# Patient Record
Sex: FEMALE | Race: WHITE | Employment: OTHER | URBAN - METROPOLITAN AREA
[De-identification: names, ages, dates, MRNs, and addresses within clinical notes are randomized per-mention and may not be internally consistent; named-entity substitution may affect disease eponyms.]

---

## 2022-03-20 ENCOUNTER — HOSPITAL ENCOUNTER (EMERGENCY)
Age: 72
Discharge: HOME | End: 2022-03-20
Attending: EMERGENCY MEDICINE
Payer: MEDICARE

## 2022-03-20 ENCOUNTER — APPOINTMENT (OUTPATIENT)
Dept: GENERAL RADIOLOGY | Age: 72
End: 2022-03-20
Payer: MEDICARE

## 2022-03-20 VITALS
HEIGHT: 66 IN | TEMPERATURE: 97.3 F | RESPIRATION RATE: 20 BRPM | SYSTOLIC BLOOD PRESSURE: 136 MMHG | WEIGHT: 200 LBS | BODY MASS INDEX: 32.14 KG/M2 | OXYGEN SATURATION: 100 % | DIASTOLIC BLOOD PRESSURE: 55 MMHG | HEART RATE: 71 BPM

## 2022-03-20 DIAGNOSIS — M25.462 PAIN AND SWELLING OF LEFT KNEE: Primary | ICD-10-CM

## 2022-03-20 DIAGNOSIS — M25.562 PAIN AND SWELLING OF LEFT KNEE: Primary | ICD-10-CM

## 2022-03-20 PROCEDURE — 99284 EMERGENCY DEPT VISIT MOD MDM: CPT

## 2022-03-20 PROCEDURE — 73560 X-RAY EXAM OF KNEE 1 OR 2: CPT

## 2022-03-20 PROCEDURE — 96372 THER/PROPH/DIAG INJ SC/IM: CPT

## 2022-03-20 PROCEDURE — 6360000002 HC RX W HCPCS: Performed by: EMERGENCY MEDICINE

## 2022-03-20 RX ORDER — METHYLPREDNISOLONE SODIUM SUCCINATE 125 MG/2ML
80 INJECTION, POWDER, LYOPHILIZED, FOR SOLUTION INTRAMUSCULAR; INTRAVENOUS DAILY
Status: DISCONTINUED | OUTPATIENT
Start: 2022-03-20 | End: 2022-03-20 | Stop reason: HOSPADM

## 2022-03-20 RX ORDER — PREDNISONE 10 MG/1
50 TABLET ORAL DAILY
Qty: 25 TABLET | Refills: 0 | Status: SHIPPED | OUTPATIENT
Start: 2022-03-20 | End: 2022-03-25

## 2022-03-20 RX ADMIN — METHYLPREDNISOLONE SODIUM SUCCINATE 80 MG: 125 INJECTION, POWDER, FOR SOLUTION INTRAMUSCULAR; INTRAVENOUS at 12:23

## 2022-03-20 ASSESSMENT — PAIN SCALES - GENERAL: PAINLEVEL_OUTOF10: 9

## 2022-03-20 ASSESSMENT — PAIN DESCRIPTION - FREQUENCY: FREQUENCY: INTERMITTENT

## 2022-03-20 ASSESSMENT — PAIN - FUNCTIONAL ASSESSMENT
PAIN_FUNCTIONAL_ASSESSMENT: PREVENTS OR INTERFERES SOME ACTIVE ACTIVITIES AND ADLS
PAIN_FUNCTIONAL_ASSESSMENT: 0-10

## 2022-03-20 ASSESSMENT — PAIN DESCRIPTION - LOCATION: LOCATION: KNEE

## 2022-03-20 ASSESSMENT — PAIN DESCRIPTION - ORIENTATION: ORIENTATION: LEFT

## 2022-03-20 ASSESSMENT — PAIN DESCRIPTION - PAIN TYPE: TYPE: ACUTE PAIN

## 2022-03-20 ASSESSMENT — PAIN DESCRIPTION - DESCRIPTORS: DESCRIPTORS: DISCOMFORT

## 2022-03-20 ASSESSMENT — PAIN DESCRIPTION - ONSET: ONSET: ON-GOING

## 2022-03-20 NOTE — ED PROVIDER NOTES
[address] COMPLAINT  Knee Pain       HISTORY OF PRESENT ILLNESS  Monisha Linda is a 66 y.o. female  who presents to the ED complaining of left knee pain. The patient states that she has been having pain for the past week it is progressively worsened. She denies any specific injury, states she has had some intermittent pains in the past.  She states it is increasingly difficult to walk. She denies redness around the area, numbness tingling or weakness in the leg or foot. She denies any fevers, chills or constitutional symptoms. Denies chest pain or shortness of breath. No other complaints, modifying factors or associated symptoms. I have reviewed the following from the nursing documentation. History reviewed. No pertinent past medical history. Past Surgical History:   Procedure Laterality Date    HYSTERECTOMY       History reviewed. No pertinent family history. Social History     Socioeconomic History    Marital status:      Spouse name: Not on file    Number of children: Not on file    Years of education: Not on file    Highest education level: Not on file   Occupational History    Not on file   Tobacco Use    Smoking status: Former Smoker     Quit date: 10/10/2008     Years since quittin.4    Smokeless tobacco: Never Used   Substance and Sexual Activity    Alcohol use: No    Drug use: No    Sexual activity: Not on file   Other Topics Concern    Not on file   Social History Narrative    Not on file     Social Determinants of Health     Financial Resource Strain:     Difficulty of Paying Living Expenses: Not on file   Food Insecurity:     Worried About [address] in the Last Year: Not on file    [address] in the Last Year: Not on file   Transportation Needs:     Lack of Transportation (Medical): Not on file    Lack of Transportation (Non-Medical):  Not on file   Physical Activity:     Days of Exercise per Week: Not on file   [employer] of Exercise per Session: Not on file   Stress:     Feeling of Stress : Not on file   Social Connections:     Frequency of Communication with Friends and Family: Not on file    Frequency of Social Gatherings with Friends and Family: Not on file    Attends Rastafarian Services: Not on file    Active Member of Clubs or Organizations: Not on file    Attends Club or Organization Meetings: Not on file    Marital Status: Not on file   Intimate Partner Violence:     Fear of Current or Ex-Partner: Not on file    Emotionally Abused: Not on file    Physically Abused: Not on file    Sexually Abused: Not on file   Housing Stability:     Unable to Pay for Housing in the Last Year: Not on file    Number of [place] in the Last Year: Not on file    Unstable Housing in the Last Year: Not on file     No current facility-administered medications for this encounter. Current Outpatient Medications   Medication Sig Dispense Refill    predniSONE (DELTASONE) 10 MG tablet Take 5 tablets by mouth daily for 5 days 25 tablet 0    oxyCODONE-acetaminophen (PERCOCET) 5-325 MG per tablet Take 1-2 tabs every 4-6 hours prn pain 20 tablet 0    ondansetron (ZOFRAN) 4 MG tablet Take 1 tablet by mouth every 8 hours as needed for Nausea 20 tablet 0     No Known Allergies    REVIEW OF SYSTEMS  10 systems reviewed, pertinent positives per HPI otherwise noted to be negative. PHYSICAL EXAM  BP (!) 136/55   Pulse 71   Temp 97.3 °F (36.3 °C) (Oral)   Resp 20   Ht 5' 6\" (1.676 m)   Wt 200 lb (90.7 kg)   SpO2 100%   BMI 32.28 kg/m²    Physical exam:  General appearance: awake and cooperative. No distress. Non toxic appearing. Skin: Warm and dry. No rashes or lesions. HENT: Normocephalic. Atraumatic. Mucus membranes are moist.   Neck: supple  Eyes: ANA. EOM intact. Heart: RRR. No murmurs. Lungs: Respirations unlabored. CTAB. No wheezes, rales, or rhonchi. Good air exchange  Abdomen: No tenderness. Soft. Non distended. No peritoneal signs. Musculoskeletal: Left knee: Mild soft tissue swelling medially, no circumferential joint erythema or effusion, tenderness to palpation along the medial aspect of the joint; ACL and PCL intact no joint laxity. Compartments soft. No deformity. All extremities neurovascularly intact. Radial, Dp, and PT pulses +2/4 bilaterally  Neurological: Alert and oriented. No focal deficits. No aphasia or dysarthria. No gait ataxia. Psychiatric: Normal mood and affect. LABS  I have reviewed all labs for this visit. No results found for this visit on 03/20/22. ECG    RADIOLOGY  XR KNEE LEFT (1-2 VIEWS)    Result Date: 3/20/2022  3 VIEWS OF THE LEFT KNEE HISTORY: Pain and swelling FINDINGS: No fracture or dislocation. Patellofemoral and tibiofemoral joints are maintained. No discrete soft tissue abnormality identified. Question small suprapatellar joint effusion. 1.  No findings for acute bony abnormality. 2.  Questioned small joint effusion. ED COURSE/MDM  Patient seen and evaluated. Old records reviewed. Labs and imaging reviewed and results discussed with patient. This is a 64-year-old female presenting for evaluation of left knee pain. Her vital signs are within normal limits. She is nontoxic-appearing on exam.  She does have some mild soft tissue swelling and tenderness to the medial aspect of her knee, there is no circumferential joint swelling erythema.-Suspicion. Patient likely has some musculoskeletal disease, and has an inflammatory process causing her swelling. She has an unremarkable x-ray. She is given a dose of Solu-Medrol here. I will give her a short course of prednisone for home and she is given orthopedic referral.  We did discuss symptomatic care instructions for home as well as strict return precautions back to the ED and she voices understanding.     During the patient's ED course, the patient was given:  Medications - No data to display CLINICAL IMPRESSION  1. Pain and swelling of left knee        Blood pressure (!) 136/55, pulse 71, temperature 97.3 °F (36.3 °C), temperature source Oral, resp. rate 20, height 5' 6\" (1.676 m), weight 200 lb (90.7 kg), SpO2 100 %. Patient was given scripts for the following medications. I counseled patient how to take these medications. Discharge Medication List as of 3/20/2022 12:54 PM      START taking these medications    Details   predniSONE (DELTASONE) 10 MG tablet Take 5 tablets by mouth daily for 5 days, Disp-25 tablet, R-0Normal             Follow-up with:  [address]    Call in 1 day      Winsome Bowens, [address] [place] [address]  [phone]            DISCLAIMER: This chart was created using Dragon dictation software. Efforts were made by me to ensure accuracy, however some errors may be present due to limitations of this technology and occasionally words are not transcribed correctly.        Jun NixAt[facility] Centerjasbir  03/22/22 0514

## 2022-03-23 ENCOUNTER — TELEPHONE (OUTPATIENT)
Dept: ORTHOPEDIC SURGERY | Age: 72
End: 2022-03-23

## 2022-03-23 NOTE — TELEPHONE ENCOUNTER
Appointment Request     Patient requesting earlier appointment: Yes  Appointment offered to patient: 3/30/22  Patient Contact Number: [phone]    THE PATIENT WOULD BE A NEW PATIENT SHE WOULD LIKE TO GET IN ON 3/24/22 AT THE [facility] LOCATION IT'S FOR HER LT KNEE IF POSSIBLE. PLEASE ADVISE.

## 2022-03-24 ENCOUNTER — OFFICE VISIT (OUTPATIENT)
Dept: ORTHOPEDIC SURGERY | Age: 72
End: 2022-03-24
Payer: MEDICARE

## 2022-03-24 VITALS — WEIGHT: 200 LBS | HEIGHT: 66 IN | BODY MASS INDEX: 32.14 KG/M2

## 2022-03-24 DIAGNOSIS — G89.29 CHRONIC PAIN OF RIGHT KNEE: ICD-10-CM

## 2022-03-24 DIAGNOSIS — M17.0 PRIMARY OSTEOARTHRITIS OF BOTH KNEES: Primary | ICD-10-CM

## 2022-03-24 DIAGNOSIS — M25.561 CHRONIC PAIN OF RIGHT KNEE: ICD-10-CM

## 2022-03-24 PROCEDURE — 99203 OFFICE O/P NEW LOW 30 MIN: CPT | Performed by: PHYSICIAN ASSISTANT

## 2022-03-24 RX ORDER — ALBUTEROL SULFATE 90 UG/1
AEROSOL, METERED RESPIRATORY (INHALATION)
COMMUNITY
Start: 2021-12-24

## 2022-03-24 RX ORDER — FLUTICASONE PROPIONATE AND SALMETEROL XINAFOATE 115; 21 UG/1; UG/1
AEROSOL, METERED RESPIRATORY (INHALATION)
COMMUNITY
Start: 2022-03-16

## 2022-03-24 ASSESSMENT — ENCOUNTER SYMPTOMS: SHORTNESS OF BREATH: 1

## 2022-03-24 NOTE — PROGRESS NOTES
Review of Systems   Respiratory: Positive for shortness of breath. Asthma   All other systems reviewed and are negative.

## 2022-03-30 ENCOUNTER — HOSPITAL ENCOUNTER (OUTPATIENT)
Dept: PHYSICAL THERAPY | Age: 72
Setting detail: THERAPIES SERIES
Discharge: HOME | End: 2022-03-30
Payer: MEDICARE

## 2022-03-30 PROCEDURE — 97161 PT EVAL LOW COMPLEX 20 MIN: CPT | Performed by: PHYSICAL THERAPIST

## 2022-03-30 PROCEDURE — 97530 THERAPEUTIC ACTIVITIES: CPT | Performed by: PHYSICAL THERAPIST

## 2022-03-30 PROCEDURE — 97110 THERAPEUTIC EXERCISES: CPT | Performed by: PHYSICAL THERAPIST

## 2022-03-30 NOTE — FLOWSHEET NOTE
[facility] and [address],  [facility], [facility] [address] [place] [address]  [facility],[address]   Larry Ghotra  Phone: [phone]  Fax: [phone]      Physical Therapy Daily Treatment Note  Date:  3/30/2022    Patient Name:  Mirian Garcia    :  1950  MRN: [id number]  Restrictions/Precautions:    Medical/Treatment Diagnosis Information:  Diagnosis: M17.0 (ICD-10-CM) - Primary osteoarthritis of both knees  Treatment Diagnosis: Knee pain/ swelling/ difficulty walking/ limited ROM/ LE muscle weakness/ LE balance deficits    Insurance/Certification information:   AETNA/ EFF 2022/ IND OOP MAX 6700; MET 90/ 0% CO-INS/ CO-PAY 40 PER VISIT/ INS % WHEN MET/ NO VISIT LIMIT; MED NEC/ [email or web address] HE[facility] NO/ NO AUTH REQ/ REF #[id number]/ 3-30-22  Physician Information:   Emilee Barthel, MD  Plan of care signed (Y/N): Pending    Date of Patient follow up with Physician: FU PRN per change in symptoms    G-Code (if applicable):      Date G-Code Applied:  3/30/22   LEFS Score: 48/ 80= 60%    Progress Note:          []? Yes                        [x]? No              Next due by: Visit #10  (22)     Latex Allergy:  [x]? NO      []? YES  Preferred Language for Healthcare:   [x]? English       []? other:    Visit # Insurance Allowable   1 Medical necessity     Pain Scale: 0 /10 @ rest                    2/ 10 @ worst  Easing factors: Rest; meds; positional changes  Provocative factors: Prolonged standing; walking; stairs; kneeling; squatting   Type: []? Constant       [x]? Intermittent  []? Radiating     []? Localized     []? other:                  SUBJECTIVE: Patient states that a few weeks ago, she was getting out of bed in the morning and felt acute medial knee pain. States insidious onset; progressive over the prior 3 weeks, but seemed to get mild relief after a steroid injection/ MDP/ OTC NSAIDS. Feels stiff/ painful after use of either heat or ice. Feels L LE is weaker/ stiffer than the R. Wants to resume pain free work and begin a home exercise program.  (-) giving way  (+) night pain/ positional  (-) popping  (-)catching/locking  (-) swelling  (+) stiffness/ sitting  (+) stairs/ up= down  (NO) kneeling/squatting    OBJECTIVE:       LEFS Score: 48/ 80= 60% (3/30/22; Initial)     3-30-22  Flexibility L R Comment   Hamstring ++ ++     Gastroc + +     ITB + +     Quad ++ +                  3-30-22            ROM PROM AROM Overpressure Comment     L R L R L R     Flexion 105 130             Extension 0 0                                                    3-30-22  Strength L R Comment   Quad 3+/5 4/5 ITA 0 deg   Hamstring         Gastroc         Hip  flexion         Hip abd                                3-30-22  Special Test Results/Comment   Meniscal Click (-)  Pain at end ROM   Crepitus 1-2/3  30-0 deg   Flexion Test (+)  Pain at end of ROM   Valgus Laxity (-)   Varus Laxity (-)   Lachmans (-)   Drop Back (-)   Homans (-)   Temperature (-)   LE (+) lateral tilt  (+) J sign  (-) apprehension      3-30-22  Girth L R   Mid Patella 43.1 41.2   Suprapatellar 44.0 42.5   5cm above 48.4 46.4   15cm above          Reflexes/Sensation:               []?Dermatomes/Myotomes intact               []?Reflexes equal and normal bilaterally              [x]? Other: NT     Joint mobility:                [x]? Normal                      []?Hypo              []?Hyper     Palpation: (+) tenderness along the L MJL     Functional Mobility/Transfers: Independent; deliberate with self L LE transfer from floor to table; transfers on table for positional change     Posture: Able to rest in full knee extension, although painful > a couple of minutes; bilateral genu valgum     Bandages/Dressings/Incisions: NA     Gait: FWB; antalgic gait pattern with limited swing flexion and decreased L stance phase     Orthopedic Special Tests:       RESTRICTIONS/PRECAUTIONS:  Torn L medial meniscus; bilateral knee OA; bilateral EMM    Exercises/Interventions:   Exercise/Equipment Resistance/Repetitions Other comments 3/30/2022   Stretching      Hamstring 30\"x 5  x   Hip Flexion      ITB      Grion      Quad      Inclined Calf      Towel Pull            SLR      Supine 1-3x 5 Horizontal wedge for LB x   Prone      Abduction 1-3x 5 Standing x   Adducton 1-2x 10\"x 10 PS; flexed x   SLR+            Isometrics      Quad sets            Patellar Glides      Medial      Superior      Inferior            ROM      Passive      Active      Weight Shift      Hang Weights      Sheet Pulls      Ankle Pumps            CKC      Calf raises 1-3x 10  x   Wall sits      Step ups      1 leg stand      Squatting      CC TKE      Balance            PRE      Extension 1-3x 10 RANGE: 90-0; x   Flexion  RANGE:          Cable Column            Leg Press  RANGE:          Bike      Treadmill              Other Therapeutic Activities:   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 Initial HEP discussed and completed. Full written, verbal, and demonstration provided. Patient Education:      Full conservative management instructions provided. Written and verbal guidelines provided for but not limited to: DME/ HEP/ ICE/ gait/ general medical instructions. Manual Treatments:       Therapeutic Exercise and NMR EXR  [x] (54516) Provided verbal/tactile cueing for activities related to strengthening, flexibility, endurance, ROM for improvements in LE, proximal hip, and core control with self care, mobility, lifting, ambulation. [x] (20616) Provided verbal/tactile cueing for activities related to improving balance, coordination, kinesthetic sense, posture, motor skill, proprioception  to assist with LE, proximal hip, and core control in self care, mobility, lifting, ambulation and eccentric single leg control.      NMR and Therapeutic Activities: [x] (90360 or 27410) Provided verbal/tactile cueing for activities related to improving balance, coordination, kinesthetic sense, posture, motor skill, proprioception and motor activation to allow for proper function of core, proximal hip and LE with self care and ADLs  [x] (11014) Gait Re-education- Provided training and instruction to the patient for proper LE, core and proximal hip recruitment and positioning and eccentric body weight control with ambulation re-education including up and down stairs     Home Exercise Program:    [x] (65815) Reviewed/Progressed HEP activities related to strengthening, flexibility, endurance, ROM of core, proximal hip and LE for functional self-care, mobility, lifting and ambulation/stair navigation   [x] (83331)Reviewed/Progressed HEP activities related to improving balance, coordination, kinesthetic sense, posture, motor skill, proprioception of core, proximal hip and LE for self care, mobility, lifting, and ambulation/stair navigation      Manual Treatments:  PROM / STM / Oscillations-Mobs:  G-I, II, III, IV (PA's, Inf., Post.)  [x] (54574) Provided manual therapy to mobilize LE, proximal hip and/or LS spine soft tissue/joints for the purpose of modulating pain, promoting relaxation,  increasing ROM, reducing/eliminating soft tissue swelling/inflammation/restriction, improving soft tissue extensibility and allowing for proper ROM for normal function with self care, mobility, lifting and ambulation.      Modalities:    [] hot packs  [] EMS   [] Ultrasound  [] ice   [] vasopneumatic  [] high volt/EGS  [] phono  [] tens    [] ionto  [] autorange/biodex [] Interferential  [] other     Charges:  Timed Code Treatment Minutes: 30'   Total Treatment Minutes: 61'     [x] EVAL: l1  [x] ZT(49063) x  1   [] IONTO  [] NMR (95517) x      [] VASO  [] Manual (93963) x       [] Other:  [x] TA x  1    [] Mech Traction (91947)  [] ES(attended) (99236)      [] ES (un) (14221):     GOALS: Patient stated goal: Would like to begin a HEP to allow the L leg to get stronger    []? Progressing: []? Met: []? Not Met: []? Adjusted     Therapist goals for Patient:   Short Term Goals: To be achieved in: 2 weeks  1. Independent in HEP and progression per patient tolerance, in order to prevent re-injury. []? Progressing: []? Met: []? Not Met: []? Adjusted   2. Patient will have a decrease in pain to facilitate improvement in movement, function, and ADLs as indicated by Functional Deficits. []? Progressing: []? Met: []? Not Met: []? Adjusted     Long Term Goals: To be achieved in: 12 weeks  1. Disability index score of 25% or less for the LEFS to assist with reaching prior level of function. []? Progressing: []? Met: []? Not Met: []? Adjusted  2. Patient will demonstrate increased AROM to 0-130 deg to allow for proper joint functioning as indicated by patients Functional Deficits. []? Progressing: []? Met: []? Not Met: []? Adjusted  3. Patient will demonstrate an increase in Strength to good proximal hip strength and control, within 5lb HHD in LE to allow for proper functional mobility as indicated by patients Functional Deficits. []? Progressing: []? Met: []? Not Met: []? Adjusted  4. Patient will return to 75%>  functional activities without increased symptoms or restriction. []? Progressing: []? Met: []? Not Met: []? Adjusted  5. Patient will resume a normal, reciprocal, pain free gait pattern. []? Progressing: []? Met: []? Not Met: []? Adjusted         Progression Towards Functional goals:  [] Patient is progressing as expected towards functional goals listed. [] Progression is slowed due to complexities listed. [] Progression has been slowed due to co-morbidities. [x] Plan just implemented, too soon to assess goals progression  [] Other:     ASSESSMENT:   Functional Impairments:                [x]? Noted lumbar/proximal hip/LE hypomobility              [x]? Decreased LE functional ROM              [x]? Decreased core/proximal hip strength and neuromuscular control              [x]? Decreased LE functional strength   [x]? Reduced balance/proprioceptive control              []?other:       Functional Activity Limitations (from functional questionnaire and intake)              [x]? Reduced ability to tolerate prolonged functional positions              [x]? Reduced ability or difficulty with changes of positions or transfers between positions              [x]? Reduced ability to maintain good posture and demonstrate good body mechanics with sitting, bending, and lifting              [x]? Reduced ability to sleep              [x]? Reduced ability or tolerance with driving and/or computer work              [x]? Reduced ability to perform lifting, carrying tasks              [x]? Reduced ability to squat              [x]? Reduced ability to forward bend              [x]? Reduced ability to ambulate prolonged functional periods/distances/surfaces              [x]? Reduced ability to ascend/descend stairs              [x]? Reduced ability to run, hop or jump              []?other:     Participation Restrictions              []?Reduced participation in self care activities              [x]? Reduced participation in home management activities              [x]? Reduced participation in work activities              [x]? Reduced participation in social activities. [x]? Reduced participation in sport activities.     Classification :               []?Signs/symptoms consistent with post-surgical status including decreased ROM, strength and function.              []?Signs/symptoms consistent with joint sprain/strain              [x]? Signs/symptoms consistent with patella-femoral syndrome              [x]? Signs/symptoms consistent with knee OA/hip OA              [x]? Signs/symptoms consistent with internal derangement of knee/Hip              [x]? Signs/symptoms consistent with functional hip weakness/NMR control []?Signs/symptoms consistent with tendinitis/tendinosis                     []?signs/symptoms consistent with pathology which may benefit from Dry needling                       []?other:       Prognosis/Rehab Potential:                                       []?Excellent              [x]? Good                         [x]? Fair              []?Poor     Tolerance of evaluation/treatment:               []?Excellent              []?Good                         [x]? Fair:               []?Poor    Treatment/Activity Tolerance:  [] Patient tolerated treatment well [] Patient limited by fatique  [x] Patient limited by pain  [] Patient limited by other medical complications  [x] Other: Significant functional ADL limitations include, but not limited to knee pain/ swelling/ difficulty walking/ limited ROM/ LE muscle weakness/ LE balance deficits    Patient Requires Follow-up: [x] Yes  [] No    PLAN:   [] Continue per plan of care [] Alter current plan (see comments)  [x] Plan of care initiated [] Hold pending MD visit [] Discharge  Frequency/Duration:  1 days per week for 12 Weeks:  Interventions: Focus on HEP progression  Discussion for 3 visits over 6-8 weeks depending on progression      Electronically signed by: Vida Lawton PT     Note: If patient does not return for scheduled/ recommended follow up visits, this note will serve as a discharge from care along with most recent update on progress.

## 2022-03-30 NOTE — PLAN OF CARE
[facility] and [address],  Sports Performance and Rehabilitation, [facility] [address]199 1246 [address]  [address] [facility],5[address]   Larry Ghotra  Phone: [phone]  Fax: [phone]       Physical Therapy Certification    Dear Karma Murcia MD,    We had the pleasure of evaluating the following patient for physical therapy services at [address] [place] [address]. A summary of our findings can be found in the initial assessment below. This includes our plan of care. If you have any questions or concerns regarding these findings, please do not hesitate to contact me at the office phone number checked above. Thank you for the referral.       Physician Signature:_______________________________Date:__________________  By signing above (or electronic signature), therapists plan is approved by physician      Patient: Grace Hansen   : 1950   MRN: [id number]  Referring Physician: Referring Practitioner: Karma Murcia MD      Evaluation Date: 3/30/2022      Medical Diagnosis Information:  Diagnosis: M17.0 (ICD-10-CM) - Primary osteoarthritis of both knees   Treatment Diagnosis: Knee pain/ swelling/ difficulty walking/ limited ROM/ LE muscle weakness/ LE balance deficits                                           Insurance information: P AET[employer]/ EFF 2022/ IND OOP MAX 6700; MET 90/ 0% CO-INS/ CO-PAY 40 PER VISIT/ INS % WHEN MET/ NO VISIT LIMIT; MED NEC/ TELE HEALTH NO/ NO AUTH REQ/ REF #[id number]/ 3-30-22     Precautions/ Contra-indications:  Torn L medial meniscus; bilateral knee OA; bilateral EMM    C-SSRS Triggered by Intake questionnaire (Past 2 wk assessment):   [x] No, Questionnaire did not trigger screening.   [] Yes, Patient intake triggered further evaluation      [] C-SSRS Screening completed  [] PCP notified via Plan of Care  [] Emergency services notified     Latex Allergy:  [x]NO      []YES  Preferred Language for Healthcare:   [x]English []other:    SUBJECTIVE: Patient states that a few weeks ago, she was getting out of bed in the morning and felt acute medial knee pain. States insidious onset; progressive over the prior 3 weeks, but seemed to get mild relief after a steroid injection/ MDP/ OTC NSAIDS. Feels stiff/ painful after use of either heat or ice. Feels L LE is weaker/ stiffer than the R. Wants to resume pain free work and begin a home exercise program.    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    Living Status/Prior Level of Function: Independent with ADLs and IADLs, fitness walking; physicality of work    OBJECTIVE:      LEFS Score: 48/ 80= 60% (3/30/22;  Initial)    3-30-22  Flexibility L R Comment   Hamstring ++ ++    Gastroc + +    ITB + +    Quad ++ +            3-30-22  ROM PROM AROM Overpressure Comment    L R L R L R    Flexion 105 130        Extension 0 0                              3-30-22  Strength L R Comment   Quad 3+/5 4/5 ITA 0 deg   Hamstring      Gastroc      Hip  flexion      Hip abd                    3-30-22  Special Test Results/Comment   Meniscal Click (-)  Pain at end ROM   Crepitus 1-2/3  30-0 deg   Flexion Test (+)  Pain at end of ROM   Valgus Laxity (-)   Varus Laxity (-)   Lachmans (-)   Drop Back (-)   Homans (-)   Temperature (-)   LE (+) lateral tilt  (+) J sign  (-) apprehension     3-30-22  Girth L R   Mid Patella 43.1 41.2   Suprapatellar 44.0 42.5   5cm above 48.4 46.4   15cm above       Reflexes/Sensation:    []Dermatomes/Myotomes intact    []Reflexes equal and normal bilaterally   [x]Other: NT    Joint mobility:     [x]Normal    []Hypo   []Hyper    Palpation: (+) tenderness along the L MJL    Functional Mobility/Transfers: Independent; deliberate with self L LE transfer from floor to table; transfers on table for positional change    Posture: Able to rest in full knee extension, although painful > a couple of minutes; bilateral genu valgum    Bandages/Dressings/Incisions: NA    Gait: FWB; antalgic gait pattern with limited swing flexion and decreased L stance phase    Orthopedic Special Tests:                        [x] Patient history, allergies, meds reviewed. Medical chart reviewed. See intake form. Review Of Systems (ROS):  [x]Performed Review of systems (Integumentary, CardioPulmonary, Neurological) by intake and observation. Intake form has been scanned into medical record. Patient has been instructed to contact their primary care physician regarding ROS issues if not already being addressed at this time.       Co-morbidities/Complexities (which will affect course of rehabilitation):   []None           Arthritic conditions   []Rheumatoid arthritis (M05.9)  [x]Osteoarthritis (M19.91)   Cardiovascular conditions   []Hypertension (I10)  []Hyperlipidemia (E78.5)  []Angina pectoris (I20)  []Atherosclerosis (I70)   Musculoskeletal conditions   []Disc pathology   []Congenital spine pathologies   []Prior surgical intervention  []Osteoporosis (M81.8)  []Osteopenia (M85.8)   Endocrine conditions   []Hypothyroid (E03.9)  []Hyperthyroid Gastrointestinal conditions   []Constipation (Q70.91)   Metabolic conditions   []Morbid obesity (E66.01)  []Diabetes type 1(E10.65) or 2 (E11.65)   []Neuropathy (G60.9)     Pulmonary conditions   [x]Asthma (J45)  []Coughing   []COPD (J44.9)   Psychological Disorders  []Anxiety (F41.9)  []Depression (F32.9)   []Other:   []Other:          Barriers to/and or personal factors that will affect rehab potential:              []Age  []Sex []Motivation/Lack of Motivation                        [x]Co-Morbidities              []Cognitive Function, education/learning barriers              []Environmental, home barriers              []profession/work barriers  []past PT/medical experience  []other:  Justification: Combination of medial meniscus tear and OA; goal for conservative management    Falls Risk Assessment (30 days):   [x] Falls Risk assessed and no intervention required. [] Falls Risk assessed and Patient requires intervention due to being higher risk   TUG score (>12s at risk):     [] Falls education provided, including         ASSESSMENT:   Functional Impairments:     [x]Noted lumbar/proximal hip/LE hypomobility   [x]Decreased LE functional ROM   [x]Decreased core/proximal hip strength and neuromuscular control   [x]Decreased LE functional strength   [x]Reduced balance/proprioceptive control   []other:      Functional Activity Limitations (from functional questionnaire and intake)   [x]Reduced ability to tolerate prolonged functional positions   [x]Reduced ability or difficulty with changes of positions or transfers between positions   [x]Reduced ability to maintain good posture and demonstrate good body mechanics with sitting, bending, and lifting   [x]Reduced ability to sleep   [x] Reduced ability or tolerance with driving and/or computer work   [x]Reduced ability to perform lifting, carrying tasks   [x]Reduced ability to squat   [x]Reduced ability to forward bend   [x]Reduced ability to ambulate prolonged functional periods/distances/surfaces   [x]Reduced ability to ascend/descend stairs   [x]Reduced ability to run, hop or jump   []other:     Participation Restrictions   []Reduced participation in self care activities   [x]Reduced participation in home management activities   [x]Reduced participation in work activities   [x]Reduced participation in social activities. [x]Reduced participation in sport activities.     Classification : []Signs/symptoms consistent with post-surgical status including decreased ROM, strength and function. []Signs/symptoms consistent with joint sprain/strain   [x]Signs/symptoms consistent with patella-femoral syndrome   [x]Signs/symptoms consistent with knee OA/hip OA   [x]Signs/symptoms consistent with internal derangement of knee/Hip   [x]Signs/symptoms consistent with functional hip weakness/NMR control      []Signs/symptoms consistent with tendinitis/tendinosis    []signs/symptoms consistent with pathology which may benefit from Dry needling      []other:      Prognosis/Rehab Potential:      []Excellent   [x]Good    [x]Fair   []Poor    Tolerance of evaluation/treatment:    []Excellent   []Good    [x]Fair: Significant functional ADL limitations include, but not limited to knee pain/ swelling/ difficulty walking/ limited ROM/ LE muscle weakness/ LE balance deficits   []Poor    Physical Therapy Evaluation Complexity Justification  [x] A history of present problem with:  [] no personal factors and/or comorbidities that impact the plan of care;  [x]1-2 personal factors and/or comorbidities that impact the plan of care  []3 personal factors and/or comorbidities that impact the plan of care  [x] An examination of body systems using standardized tests and measures addressing any of the following: body structures and functions (impairments), activity limitations, and/or participation restrictions;:  [] a total of 1-2 or more elements   [x] a total of 3 or more elements   [] a total of 4 or more elements   [x] A clinical presentation with:  [x] stable and/or uncomplicated characteristics   [] evolving clinical presentation with changing characteristics  [] unstable and unpredictable characteristics;   [x] Clinical decision making of [x] low, [] moderate, [] high complexity using standardized patient assessment instrument and/or measurable assessment of functional outcome.     [x] EVAL (LOW) 90474 (typically 20 minutes patients Functional Deficits. [] Progressing: [] Met: [] Not Met: [] Adjusted  4. Patient will return to 75%>  functional activities without increased symptoms or restriction. [] Progressing: [] Met: [] Not Met: [] Adjusted  5. Patient will resume a normal, reciprocal, pain free gait pattern. [] Progressing: [] Met: [] Not Met: [] Adjusted       Electronically signed by:  Junior Elvis PT    Note: If patient does not return for scheduled/ recommended follow up visits, this note will serve as a discharge from care along with most recent update on progress.

## 2022-04-26 NOTE — PROGRESS NOTES
12 [facility]  History and Physical      Date:  3/24/2022    Name:  Lois Farmer  Address:  [address] [place] [address]    :  1950      Age:   66 y.o. Chief Complaint    New Patient (L knee)      History of Present Illness:  Lois Farmer is a 66 y.o. female who presents for an evaluation of her left knee pain. Patient states that approximate 1 week ago she began experiencing left knee pain. She did not have any pain before this and states that she woke up with pain that she describes as dull and achy with occasional sharp bouts in the medial joint line with stairs. She denies any other aggravating factors. It is alleviated by rest.  She still experiences pain at night. She was seen in the emergency department where she had x-rays are unremarkable. They gave her a steroid shot and Medrol Dosepak. Otherwise she has been treating this with conservative therapy including: Rest, ice, activity modification and anti-inflammatories. She denies any mechanical symptoms or sensations of instability. The patient denies any new injury. The patient denies any catching, giving way, joint locking, numbness, paresthesias, or weakness. Pain Assessment  Location of Pain: Knee  Location Modifiers: Left  Quality of Pain: Sharp,Dull,Aching  Duration of Pain: Persistent  Frequency of Pain: Constant  Limiting Behavior: Yes  Relieving Factors: Rest,Nsaids  Result of Injury: No  Work-Related Injury: No  Are there other pain locations you wish to document?: No    Past Medical History:   Diagnosis Date    Arthritis     Asthma         Past Surgical History:   Procedure Laterality Date    HYSTERECTOMY      HYSTERECTOMY         History reviewed. No pertinent family history.     Social History     Socioeconomic History    Marital status:      Spouse name: None    Number of children: None    Years of education: None    Highest education level: None   Occupational History    None   Tobacco Use    Smoking status: Former Smoker     Quit date: 10/10/2008     Years since quittin.5    Smokeless tobacco: Never Used   Substance and Sexual Activity    Alcohol use: Yes    Drug use: No    Sexual activity: None   Other Topics Concern    None   Social History Narrative    None     Social Determinants of Health     Financial Resource Strain:     Difficulty of Paying Living Expenses: Not on file   Food Insecurity:     Worried About Running Out of Food in the Last Year: Not on file    [place] of Food in the Last Year: Not on file   Transportation Needs:     Lack of Transportation (Medical): Not on file    Lack of Transportation (Non-Medical):  Not on file   Physical Activity:     Days of Exercise per Week: Not on file    Minutes of Exercise per Session: Not on file   Stress:     Feeling of Stress : Not on file   Social Connections:     Frequency of Communication with Friends and Family: Not on file    Frequency of Social Gatherings with Friends and Family: Not on file    Attends Alevism Services: Not on file    Active Member of [address] [place] [address] or Organizations: Not on file    Attends Club or Organization Meetings: Not on file    Marital Status: Not on file   Intimate Partner Violence:     Fear of Current or Ex-Partner: Not on file    Emotionally Abused: Not on file    Physically Abused: Not on file    Sexually Abused: Not on file   Housing Stability:     Unable to Pay for Housing in the Last Year: Not on file    Number of [place] in the Last Year: Not on file    Unstable Housing in the Last Year: Not on file       Current Outpatient Medications   Medication Sig Dispense Refill    diclofenac (VOLTAREN) 50 MG EC tablet Take 1 tablet by mouth 2 times daily (with meals) for 7 days 14 tablet 0    albuterol sulfate  (90 Base) MCG/ACT inhaler INHALE 2 PUFFS BY MOUTH EVERY 6 HOURS AS NEEDED FOR WHEEZING      ADVAIR -21 MCG/ACT inhaler INHALE 2 PUFFS BY INHALATION ROUTE EVERY 12 HOURS.  oxyCODONE-acetaminophen (PERCOCET) 5-325 MG per tablet Take 1-2 tabs every 4-6 hours prn pain (Patient not taking: Reported on 3/24/2022) 20 tablet 0    ondansetron (ZOFRAN) 4 MG tablet Take 1 tablet by mouth every 8 hours as needed for Nausea (Patient not taking: Reported on 3/24/2022) 20 tablet 0     No current facility-administered medications for this visit. No Known Allergies      Review of Systems:  A 14 point review of systems was completed by the patient and is available in the media section of the scanned medical record and was reviewed. Vital Signs:   Ht 5' 6\" (1.676 m)   Wt 200 lb (90.7 kg)   BMI 32.28 kg/m²     General Exam:    General: Dontae Treviño is a healthy and well appearing 66y.o. year old female who is sitting comfortably in our office in no acute distress. Neuro: Alert & Oriented x 3,  normal,  no focal deficits noted. Normal mood & affect. Eyes: Sclera clear  Ears: Normal external ear  Mouth: Patient wearing a mask  Pulm: Respirations unlabored and regular   Skin: Warm, well perfused      Knee Examination: Left    Inspection: Mild soft tissue swelling. No effusion, ecchymosis, discoloration, erythema, excessive warmth. no gross deformities, no signs of infection. Palpation: There is patellofemoral crepitus, there is medial joint line tenderness. No other osseous or soft tissue tenderness around the knee. Negative calf tenderness    Range of Motion: 0-130 degrees without pain or difficulty. Appropriate patellar mobility. Strength: Grossly intact    Special Tests: No ligament instability, valgus and varus stress test are stable at 0 and 30°. Lachman's exhibits a solid endpoint. Negative posterior drawer. Negative Homans sign.   Negative Loretta's    Gait: antalgic, without the use of assistive devices    Alignment: Valgus deformity    Neuro: Sensation equal bilateral lower extremities    Vascular: 2+ posterior tibialis pulse        Radiology:   Previously obtain imaging reviewed. No new images obtained. X-ray imaging reviewed from the patient's ER visit on 03/20/2022  Impression: No fractures, dislocations or acute trauma noted. Decreased medial and lateral joint space of the femoral-tibial compartments. Mildly decreased patellofemoral joint space. Patella is riding appropriately in the trochlear groove with no subluxation or tilt. Office Procedures:  Orders Placed This Encounter   Procedures    Ambulatory referral to Physical Therapy     Referral Priority:   Routine     Referral Type:   Eval and Treat     Referral Reason:   Specialty Services Required     Requested Specialty:   Physical Therapy     Number of Visits Requested:   1            Assessment: Patient is a 66 y.o. female presenting to the office for an evaluation of her left knee pain. This patient has a diagnosis of an exacerbation of pre-existing osteoarthritis. Encounter Diagnoses   Name Primary?  Primary osteoarthritis of both knees Yes    Chronic pain of right knee        Orders Placed This Encounter   Medications    diclofenac (VOLTAREN) 50 MG EC tablet     Sig: Take 1 tablet by mouth 2 times daily (with meals) for 7 days     Dispense:  14 tablet     Refill:  0       Medical decision making:  Patient's workup and evaluation were reviewed with the patient in the office today. Imaging reviewed with the patient. Given the patient's history and physical exam I believe the patient will do well with conservative treatment. She was educated on this plan as detailed below. She should follow-up as needed. The patient was advised that NSAID-type medications have several potential side effects that include: gastrointestinal irritation including hemorrhage, renal injury, as well as an increased risk for heart attack and stroke.  The patient was asked to take the medication with food and to stop if there is any symptoms of GI upset, including heartburn, nausea, increased gas or diarrhea. I asked the patient to contact their medical provider for vomiting, abdominal pain or black/bloody stools. The patient should have renal function testing per his medical provider periodically if the medication is taken on a regular basis. The patient should be alert for any swelling in the lower extremities and should stop taking the medication immediately and contact their medical provider should this occur. In addition, the patient should stop taking the medication immediately and contact their medical provider should there be any shortness of breath, fatigue and be evaluated in an emergency facility for any chest pain. All the patient's questions were answered while in the clinic. The patient is understanding of all instructions and agrees with the plan. Treatment Plan:    1. Activity modification/Rest   2. Ice 20 minutes ever 1-2 hours PRN  3. Take medications as prescribed/instructed  4. Elevation   5. Lightweight exercise/low impact exercise  6. Appropriate diet/weight management      Follow up: As needed    This patient exhibits moderate complexity for obtaining an independent history, reviewing past medical records, independent interpretation/review of diagnostic imaging, and further coordinating care. The patient exhibits low risk given that the patient is being treated with conservative therapy. Approximately 30 minutes were spent reviewing past medical records, imaging, educating the patient, and coordinating care. Sanya Anguiano PA-C    Physician Assistant - Certified    16/27/42 8:42 AM    This dictation was performed with a verbal recognition program (DRAGON) and it was checked for errors. It is possible that there are still dictated errors within this office note. If so, please bring any errors to my attention for an addendum. All efforts were made to ensure that this office note is accurate.